# Patient Record
Sex: MALE | Race: WHITE
[De-identification: names, ages, dates, MRNs, and addresses within clinical notes are randomized per-mention and may not be internally consistent; named-entity substitution may affect disease eponyms.]

---

## 2019-08-30 ENCOUNTER — HOSPITAL ENCOUNTER (EMERGENCY)
Dept: HOSPITAL 63 - ER | Age: 34
LOS: 1 days | Discharge: HOME | End: 2019-08-31
Payer: SELF-PAY

## 2019-08-30 VITALS — BODY MASS INDEX: 28.14 KG/M2 | WEIGHT: 190 LBS | HEIGHT: 69 IN

## 2019-08-30 VITALS — SYSTOLIC BLOOD PRESSURE: 140 MMHG | DIASTOLIC BLOOD PRESSURE: 88 MMHG

## 2019-08-30 DIAGNOSIS — R59.0: ICD-10-CM

## 2019-08-30 DIAGNOSIS — B34.9: ICD-10-CM

## 2019-08-30 DIAGNOSIS — Z72.0: ICD-10-CM

## 2019-08-30 DIAGNOSIS — D72.829: ICD-10-CM

## 2019-08-30 DIAGNOSIS — R07.89: Primary | ICD-10-CM

## 2019-08-30 DIAGNOSIS — F12.90: ICD-10-CM

## 2019-08-30 LAB
ALBUMIN SERPL-MCNC: 4.3 G/DL (ref 3.4–5)
ALP SERPL-CCNC: 77 U/L (ref 46–116)
ALT SERPL-CCNC: 10 U/L (ref 16–63)
AMPHETAMINE/METHAMPHETAMINE: (no result)
ANION GAP SERPL CALC-SCNC: 7 MMOL/L (ref 6–14)
APTT PPP: YELLOW S
AST SERPL-CCNC: 20 U/L (ref 15–37)
BACTERIA #/AREA URNS HPF: 0 /HPF
BARBITURATES UR-MCNC: (no result) UG/ML
BASOPHILS # BLD AUTO: 0.1 X10^3/UL (ref 0–0.2)
BASOPHILS NFR BLD: 1 % (ref 0–3)
BENZODIAZ UR-MCNC: (no result) UG/L
BILIRUB DIRECT SERPL-MCNC: 0.1 MG/DL (ref 0–0.2)
BILIRUB SERPL-MCNC: 0.3 MG/DL (ref 0.2–1)
BILIRUB UR QL STRIP: (no result)
CA-I SERPL ISE-MCNC: 10 MG/DL (ref 8–26)
CALCIUM SERPL-MCNC: 9.4 MG/DL (ref 8.5–10.1)
CANNABINOIDS UR-MCNC: (no result) UG/L
CHLORIDE SERPL-SCNC: 103 MMOL/L (ref 98–107)
CO2 SERPL-SCNC: 29 MMOL/L (ref 21–32)
COCAINE UR-MCNC: (no result) NG/ML
CREAT SERPL-MCNC: 1.1 MG/DL (ref 0.7–1.3)
EOSINOPHIL NFR BLD: 0.2 X10^3/UL (ref 0–0.7)
EOSINOPHIL NFR BLD: 2 % (ref 0–3)
ERYTHROCYTE [DISTWIDTH] IN BLOOD BY AUTOMATED COUNT: 13 % (ref 11.5–14.5)
FIBRINOGEN PPP-MCNC: CLEAR MG/DL
GFR SERPLBLD BASED ON 1.73 SQ M-ARVRAT: 76.2 ML/MIN
GLUCOSE SERPL-MCNC: 107 MG/DL (ref 70–99)
GLUCOSE UR STRIP-MCNC: (no result) MG/DL
HCT VFR BLD CALC: 46.3 % (ref 39–53)
HGB BLD-MCNC: 15.9 G/DL (ref 13–17.5)
LIPASE: 322 U/L (ref 73–393)
LYMPHOCYTES # BLD: 2.4 X10^3/UL (ref 1–4.8)
LYMPHOCYTES NFR BLD AUTO: 21 % (ref 24–48)
MAGNESIUM SERPL-MCNC: 2 MG/DL (ref 1.8–2.4)
MCH RBC QN AUTO: 32 PG (ref 25–35)
MCHC RBC AUTO-ENTMCNC: 34 G/DL (ref 31–37)
MCV RBC AUTO: 94 FL (ref 79–100)
METHADONE SERPL-MCNC: (no result) NG/ML
MONO #: 0.7 X10^3/UL (ref 0–1.1)
MONOCYTES NFR BLD: 6 % (ref 0–9)
NEUT #: 7.9 X10^3UL (ref 1.8–7.7)
NEUTROPHILS NFR BLD AUTO: 70 % (ref 31–73)
NITRITE UR QL STRIP: (no result)
OPIATES UR-MCNC: (no result) NG/ML
PCP SERPL-MCNC: (no result) MG/DL
PLATELET # BLD AUTO: 259 X10^3/UL (ref 140–400)
POTASSIUM SERPL-SCNC: 3.5 MMOL/L (ref 3.5–5.1)
PROT SERPL-MCNC: 7.7 G/DL (ref 6.4–8.2)
RBC # BLD AUTO: 4.92 X10^6/UL (ref 4.3–5.7)
RBC #/AREA URNS HPF: 0 /HPF (ref 0–2)
SODIUM SERPL-SCNC: 139 MMOL/L (ref 136–145)
SP GR UR STRIP: >=1.03
SQUAMOUS #/AREA URNS LPF: (no result) /LPF
UROBILINOGEN UR-MCNC: 0.2 MG/DL
WBC # BLD AUTO: 11.3 X10^3/UL (ref 4–11)
WBC #/AREA URNS HPF: (no result) /HPF (ref 0–4)

## 2019-08-30 PROCEDURE — 99285 EMERGENCY DEPT VISIT HI MDM: CPT

## 2019-08-30 PROCEDURE — 80307 DRUG TEST PRSMV CHEM ANLYZR: CPT

## 2019-08-30 PROCEDURE — 93005 ELECTROCARDIOGRAM TRACING: CPT

## 2019-08-30 PROCEDURE — 80076 HEPATIC FUNCTION PANEL: CPT

## 2019-08-30 PROCEDURE — 84443 ASSAY THYROID STIM HORMONE: CPT

## 2019-08-30 PROCEDURE — 83690 ASSAY OF LIPASE: CPT

## 2019-08-30 PROCEDURE — 87070 CULTURE OTHR SPECIMN AEROBIC: CPT

## 2019-08-30 PROCEDURE — 80048 BASIC METABOLIC PNL TOTAL CA: CPT

## 2019-08-30 PROCEDURE — 85730 THROMBOPLASTIN TIME PARTIAL: CPT

## 2019-08-30 PROCEDURE — 71046 X-RAY EXAM CHEST 2 VIEWS: CPT

## 2019-08-30 PROCEDURE — 82550 ASSAY OF CK (CPK): CPT

## 2019-08-30 PROCEDURE — 85610 PROTHROMBIN TIME: CPT

## 2019-08-30 PROCEDURE — 83735 ASSAY OF MAGNESIUM: CPT

## 2019-08-30 PROCEDURE — 36415 COLL VENOUS BLD VENIPUNCTURE: CPT

## 2019-08-30 PROCEDURE — 84484 ASSAY OF TROPONIN QUANT: CPT

## 2019-08-30 PROCEDURE — 83880 ASSAY OF NATRIURETIC PEPTIDE: CPT

## 2019-08-30 PROCEDURE — 85379 FIBRIN DEGRADATION QUANT: CPT

## 2019-08-30 PROCEDURE — 85025 COMPLETE CBC W/AUTO DIFF WBC: CPT

## 2019-08-30 PROCEDURE — 87880 STREP A ASSAY W/OPTIC: CPT

## 2019-08-30 PROCEDURE — 80061 LIPID PANEL: CPT

## 2019-08-30 PROCEDURE — 81001 URINALYSIS AUTO W/SCOPE: CPT

## 2019-08-30 NOTE — RAD
CHEST PA   LATERAL 

 

INDICATION: Chest pain. 

 

COMPARISON STUDY: None.

 

FINDINGS:

 

Lungs: Normal lung volume. No pulmonary mass or consolidation. The 

tracheobronchial tree and hilar structures are normal.

 

Pleura: No pleural effusion or pneumothorax.

 

Heart and Mediastinum: The cardiomediastinal silhouette is normal. The 

great vessels of the thorax are normal.

 

Bones and Soft Tissues: The bones and soft tissues are within normal 

limits.

 

IMPRESSION:  

No acute cardiopulmonary process.

 

Electronically signed by: Socrates Driver MD (8/30/2019 9:43 PM) [facility]

## 2019-08-30 NOTE — ED.ADGEN
Adult General


Chief Complaint


Chief Complaint


".. I got this bump on Lt side of my neck.. and feels like maybe on having some 

chest discomfort and radiation into my left shoulder.... It's been there for the

last couple days..."





HPI


HPI





Patient is a 34 year old male who presents with above hx and complaints 

adenopathy and chest pain. Patient denies previous cardiac history. Patient does

smoke and use marijuana. Patient has subjective complaints of myalgia and 

arthralgia. Area chest pain appears to be upper chest and left shoulder area. 

Movement exacerbates pain. The patient does have adenopathy on left anterior 

cervical chain.   No history of trauma. No history immunosuppression. No history

of travel.  Is no family history of cardiac disorder in his age group





Review of Systems


Review of Systems





Constitutional: Denies fever or chills []


Eyes: Denies change in visual acuity, redness, or eye pain []


HENT: Denies nasal congestion or sore throat []complains of adenopathy and neck 

pain


Respiratory: Denies cough or shortness of breath []


Cardiovascular: No additional information not addressed in HPI []


GI: Denies abdominal pain, nausea, vomiting, bloody stools or diarrhea []


: Denies dysuria or hematuria []


Musculoskeletal: Denies back pain or joint pain []complains of chest and left 

shoulder pain


Integument: Denies rash or skin lesions []


Neurologic: Denies headache, focal weakness or sensory changes []


Endocrine: Denies polyuria or polydipsia []





All other systems were reviewed and found to be within normal limits, except as 

documented in this note.





Family History


Family History


Non-Contributory





Current Medications


Current Medications





Current Medications








 Medications


  (Trade)  Dose


 Ordered  Sig/Colby  Start Time


 Stop Time Status Last Admin


Dose Admin


 


 Aspirin


  (Children'S


 Aspirin)  324 mg  1X  ONCE  19 21:45


 19 21:46 DC 19 22:01


324 MG


 


 Lactated Ringer's  1,000 ml @ 


 1,000 mls/hr  Q1H  19 21:12


 19 22:11 DC 19 22:01


1,000 MLS/HR











Allergies


Allergies





Allergies








Coded Allergies Type Severity Reaction Last Updated Verified


 


  No Known Drug Allergies    19 No











Physical Exam


Physical Exam





Constitutional: Well developed, well nourished, no acute distress, non-toxic 

appearance. []


HENT: Normocephalic, atraumatic, bilateral external ears normal, oropharynx 

moist, no oral exudates, nose normal. []


Eyes: PERRLA, EOMI, conjunctiva normal, no discharge. [] 


Neck: Normal range of motion, left adenopathy tenderness, supple, no stridor. []

 


Cardiovascular:Heart rate regular rhythm, no murmur []


Lungs & Thorax:  Bilateral breath sounds equal apex with scattered wheezes on 

auscultation []


Abdomen: Bowel sounds normal, soft, no tenderness, no masses, no pulsatile 

masses. [] 


Skin: Warm, dry, no erythema, no rash. [] 


Back: No tenderness, no CVA tenderness. [] 


Extremities: No tenderness, no cyanosis, no clubbing, ROM intact, no edema. [] 

No cording appreciated


Neurologic: Alert and oriented X 3, normal motor function, normal sensory 

function, no focal deficits noted. []


Psychologic: Affect anxious, judgement normal, mood normal. []





Current Patient Data


Vital Signs





                                   Vital Signs








  Date Time  Temp Pulse Resp B/P (MAP) Pulse Ox O2 Delivery O2 Flow Rate FiO2


 


19 21:30 98.5 98 20  99 Room Air  








Lab Results





                                Laboratory Tests








Test


 19


21:43 19


21:45 19


21:53 19


23:20


 


Urine Collection Type Unknown     


 


Urine Color Yellow     


 


Urine Clarity Clear     


 


Urine pH 5.5     


 


Urine Specific Gravity >=1.030     


 


Urine Protein


 Trace


(NEG-TRACE) 


 


 





 


Urine Glucose (UA)


 Neg mg/dL


(NEG) 


 


 





 


Urine Ketones (Stick)


 Neg mg/dL


(NEG) 


 


 





 


Urine Blood Neg (NEG)     


 


Urine Nitrite Neg (NEG)     


 


Urine Bilirubin Neg (NEG)     


 


Urine Urobilinogen Dipstick


 0.2 mg/dL (0.2


mg/dL) 


 


 





 


Urine Leukocyte Esterase Neg (NEG)     


 


Urine RBC 0 /HPF (0-2)     


 


Urine WBC


 1-4 /HPF (0-4)


 


 


 





 


Urine Squamous Epithelial


Cells Occ /LPF  


 


 


 





 


Urine Bacteria


 0 /HPF (0-FEW)


 


 


 





 


Urine Mucus Slight /LPF     


 


Urine Opiates Screen Neg (NEG)     


 


Urine Methadone Screen Neg (NEG)     


 


Urine Barbiturates Neg (NEG)     


 


Urine Phencyclidine Screen Neg (NEG)     


 


Urine


Amphetamine/Methamphetamine Neg (NEG)  


 


 


 





 


Urine Benzodiazepines Screen Neg (NEG)     


 


Urine Cocaine Screen Neg (NEG)     


 


Urine Cannabinoids Screen Pos (NEG)     


 


Urine Ethyl Alcohol Neg (NEG)     


 


White Blood Count


 


 11.3 x10^3/uL


(4.0-11.0)  H 


 





 


Red Blood Count


 


 4.92 x10^6/uL


(4.30-5.70) 


 





 


Hemoglobin


 


 15.9 g/dL


(13.0-17.5) 


 





 


Hematocrit


 


 46.3 %


(39.0-53.0) 


 





 


Mean Corpuscular Volume


 


 94 fL ()


 


 





 


Mean Corpuscular Hemoglobin  32 pg (25-35)    


 


Mean Corpuscular Hemoglobin


Concent 


 34 g/dL


(31-37) 


 





 


Red Cell Distribution Width


 


 13.0 %


(11.5-14.5) 


 





 


Platelet Count


 


 259 x10^3/uL


(140-400) 


 





 


Neutrophils (%) (Auto)  70 % (31-73)    


 


Lymphocytes (%) (Auto)  21 % (24-48)  L  


 


Monocytes (%) (Auto)  6 % (0-9)    


 


Eosinophils (%) (Auto)  2 % (0-3)    


 


Basophils (%) (Auto)  1 % (0-3)    


 


Neutrophils # (Auto)


 


 7.9 x10^3uL


(1.8-7.7)  H 


 





 


Lymphocytes # (Auto)


 


 2.4 x10^3/uL


(1.0-4.8) 


 





 


Monocytes # (Auto)


 


 0.7 x10^3/uL


(0.0-1.1) 


 





 


Eosinophils # (Auto)


 


 0.2 x10^3/uL


(0.0-0.7) 


 





 


Basophils # (Auto)


 


 0.1 x10^3/uL


(0.0-0.2) 


 





 


Prothrombin Time


 


 9.6 SEC


(9.4-11.4) 


 





 


Prothrombin Time INR  0.9 (0.9-1.1)    


 


Activated Partial


Thromboplast Time 


 26 SEC (23-33)


 


 





 


D-Dimer (Luba)


 


 0.27 mg/L


(0.00-0.50) 


 





 


Sodium Level


 


 139 mmol/L


(136-145) 


 





 


Potassium Level


 


 3.5 mmol/L


(3.5-5.1) 


 





 


Chloride Level


 


 103 mmol/L


() 


 





 


Carbon Dioxide Level


 


 29 mmol/L


(21-32) 


 





 


Anion Gap  7 (6-14)    


 


Blood Urea Nitrogen


 


 10 mg/dL


(8-26) 


 





 


Creatinine


 


 1.1 mg/dL


(0.7-1.3) 


 





 


Estimated GFR


(Cockcroft-Gault) 


 76.2  


 


 





 


Glucose Level


 


 107 mg/dL


(70-99)  H 


 





 


Calcium Level


 


 9.4 mg/dL


(8.5-10.1) 


 





 


Magnesium Level


 


 2.0 mg/dL


(1.8-2.4) 


 





 


Total Bilirubin


 


 0.3 mg/dL


(0.2-1.0) 


 





 


Direct Bilirubin


 


 0.1 mg/dL


(0.0-0.2) 


 





 


Aspartate Amino Transferase


(AST) 


 20 U/L (15-37)


 


 





 


Alanine Aminotransferase (ALT)


 


 10 U/L (16-63)


L 


 





 


Alkaline Phosphatase


 


 77 U/L


() 


 





 


Creatine Kinase


 


 155 U/L


() 


 





 


Troponin I Quantitative


 


 < 0.017 ng/mL


(0-0.055) 


 < 0.017 ng/mL


(0-0.055)


 


NT-Pro-B-Type Natriuretic


Peptide 


 11 pg/mL


(0-124) 


 





 


Total Protein


 


 7.7 g/dL


(6.4-8.2) 


 





 


Albumin


 


 4.3 g/dL


(3.4-5.0) 


 





 


Lipase


 


 322 U/L


() 


 





 


Group A Streptococcus Rapid


 


 


 Negative


(NEGATIVE) 














EKG


EKG


Plan interpretation EKG shows a sinus rhythm at 83 bpm no findings of acute S

GREG.[]





Radiology/Procedures


Radiology/Procedures


[][facility] [address], [place] [address]


                                 [phone]


                                        


                                 IMAGING REPORT





                                     Signed





PATIENT: FILIPE CARR ACCOUNT: [id number]     MRN#: [id number]


: 1985           LOCATION: ER              AGE: 34


SEX: M                    EXAM DT: 19         ACCESSION#: [id number]


STATUS: REG ER            ORD. PHYSICIAN: BHANU RODRIGEZ MD


REASON: CHEST PAIN,LEFT ARM NUMBNESS,RAPID HEARTRATE.CURRENT SMOKER


PROCEDURE: CHEST PA & LATERAL





CHEST PA   LATERAL 


 


INDICATION: Chest pain. 


 


COMPARISON STUDY: None.


 


FINDINGS:


 


Lungs: Normal lung volume. No pulmonary mass or consolidation. The 


tracheobronchial tree and hilar structures are normal.


 


Pleura: No pleural effusion or pneumothorax.


 


Heart and Mediastinum: The cardiomediastinal silhouette is normal. The 


great vessels of the thorax are normal.


 


Bones and Soft Tissues: The bones and soft tissues are within normal 


limits.


 


IMPRESSION:  


No acute cardiopulmonary process.


 


Electronically signed by: Leah Barreto MD (2019 9:43 PM) [facility]














DICTATED AND SIGNED BY:     LEAH BARRETO MD


DATE:     19





CC: BHANU RODRIGEZ MD; PCP,NO ~





Course & Med Decision Making


Course & Med Decision Making


Pertinent Labs and Imaging studies reviewed. (See chart for details).





Patient take a daily aspirin. Patient take ibuprofen and Tylenol for discomfort.

 Patient follow-up with primary care. Patient yet outpatient stress test. 

Patient to stop smoking. Patient return of any concerns.





[]





Final Impression


Final Impression


1. Chest pain-chest wall


2. Cervical adenopathy


3. Viral syndrome[]


4. Mild leukocytosis 11.3


5. Tobacco and marijuana use





Dragon Disclaimer


Dragon Disclaimer


This electronic medical record was generated, in whole or in part, using a voice

 recognition dictation system.





Dragon Disclaimer


This chart was dictated in whole or in part using Voice Recognition software in 

a busy, high-work load, and often noisy Emergency Department environment.  It 

may contain unintended and wholly unrecognized errors or omissions.











BHANU RODRIGEZ MD           Aug 30, 2019 21:11

## 2019-08-30 NOTE — EKG
[facility] [address], [place] [address]

Test Date:    2019               Test Time:    23:19:06

Pat Name:     FILIPE CARR           Department:   

Patient ID:   [id number]           Room:          

Gender:       M                        Technician:   

:          1985               Requested By: BHANU RODRIGEZ

Order Number: [id number]            Reading MD:     

                                 Measurements

Intervals                              Axis          

Rate:         70                       P:            51

NC:           158                      QRS:          26

QRSD:         78                       T:            25

QT:           366                                    

QTc:          398                                    

                           Interpretive Statements

SINUS RHYTHM

ATRIAL PREMATURE COMPLEX(ES)

NO SPECIFIC ECG ABNORMALITIES

RI6.01

No previous ECG available for comparison

## 2019-08-30 NOTE — EKG
[facility] [address], [place] [address]

Test Date:    2019               Test Time:    21:46:10

Pat Name:     FILIPE CARR           Department:   

Patient ID:   [id number]           Room:          

Gender:       M                        Technician:   

:          1985               Requested By: BHANU RODRIGEZ

Order Number: [id number]            Reading MD:     

                                 Measurements

Intervals                              Axis          

Rate:         83                       P:            64

MT:           156                      QRS:          37

QRSD:         72                       T:            39

QT:           334                                    

QTc:          398                                    

                           Interpretive Statements

SINUS RHYTHM

NO SPECIFIC ECG ABNORMALITIES

RI6.01

No previous ECG available for comparison

## 2019-08-31 LAB
CHOLEST/HDLC SERPL: 5 {RATIO}
HDLC SERPL-MCNC: 30 MG/DL (ref 40–60)
LDLC: 102 MG/DL (ref 0–100)
THYROID STIM HORMONE (TSH): 0.8 UIU/ML (ref 0.36–3.74)
TRIGL SERPL-MCNC: 129 MG/DL (ref 0–150)
VLDLC: 25 MG/DL (ref 0–40)